# Patient Record
Sex: MALE | Race: WHITE | Employment: UNEMPLOYED | RURAL
[De-identification: names, ages, dates, MRNs, and addresses within clinical notes are randomized per-mention and may not be internally consistent; named-entity substitution may affect disease eponyms.]

---

## 2017-04-17 ENCOUNTER — OFFICE VISIT (OUTPATIENT)
Dept: FAMILY MEDICINE CLINIC | Age: 27
End: 2017-04-17

## 2017-04-17 VITALS
OXYGEN SATURATION: 97 % | RESPIRATION RATE: 18 BRPM | HEIGHT: 73 IN | SYSTOLIC BLOOD PRESSURE: 167 MMHG | DIASTOLIC BLOOD PRESSURE: 101 MMHG | TEMPERATURE: 97.9 F | WEIGHT: 218 LBS | HEART RATE: 93 BPM | BODY MASS INDEX: 28.89 KG/M2

## 2017-04-17 DIAGNOSIS — L02.412 ABSCESS OF AXILLA, LEFT: Primary | ICD-10-CM

## 2017-04-17 RX ORDER — SULFAMETHOXAZOLE AND TRIMETHOPRIM 800; 160 MG/1; MG/1
1 TABLET ORAL 2 TIMES DAILY
Qty: 20 TAB | Refills: 0 | Status: SHIPPED | OUTPATIENT
Start: 2017-04-17 | End: 2017-04-27

## 2017-04-17 RX ORDER — CHLORHEXIDINE GLUCONATE 4 G/100ML
SOLUTION TOPICAL
Qty: 473 ML | Refills: 3 | Status: SHIPPED | OUTPATIENT
Start: 2017-04-17

## 2017-04-17 NOTE — MR AVS SNAPSHOT
Visit Information Date & Time Provider Department Dept. Phone Encounter #  
 4/17/2017  4:00 PM Hans Balbuena  Follow-up Instructions Return in about 3 days (around 4/20/2017) for follow up. Upcoming Health Maintenance Date Due DTaP/Tdap/Td series (1 - Tdap) 6/15/2013 INFLUENZA AGE 9 TO ADULT 8/1/2016 Allergies as of 4/17/2017  Review Complete On: 4/17/2017 By: Tal Mancini LPN Not on File Current Immunizations  Never Reviewed No immunizations on file. Not reviewed this visit You Were Diagnosed With   
  
 Codes Comments Abscess of axilla, left    -  Primary ICD-10-CM: L69.249 ICD-9-CM: 835. 3 Vitals BP Pulse Temp Resp Height(growth percentile) (!) 167/101 (BP 1 Location: Right arm, BP Patient Position: Sitting) 93 97.9 °F (36.6 °C) (Temporal) 18 6' 1\" (1.854 m) Weight(growth percentile) SpO2 BMI Smoking Status 218 lb (98.9 kg) 97% 28.76 kg/m2 Current Every Day Smoker Vitals History BMI and BSA Data Body Mass Index Body Surface Area 28.76 kg/m 2 2.26 m 2 Preferred Pharmacy Pharmacy Name Phone [id number] [address] [facility], [address] [phone] Your Updated Medication List  
  
   
This list is accurate as of: 4/17/17  4:15 PM.  Always use your most recent med list.  
  
  
  
  
 chlorhexidine 4 % liquid Commonly known as:  HIBICLENS Dilute in water and wash area daily. trimethoprim-sulfamethoxazole 160-800 mg per tablet Commonly known as:  BACTRIM DS, SEPTRA DS Take 1 Tab by mouth two (2) times a day for 10 days. Prescriptions Sent to Pharmacy Refills  
 trimethoprim-sulfamethoxazole (BACTRIM DS, SEPTRA DS) 160-800 mg per tablet 0 Sig: Take 1 Tab by mouth two (2) times a day for 10 days.   
 Class: Normal  
 Pharmacy: [facility] [address] #: [phone] Route: Oral  
 chlorhexidine (HIBICLENS) 4 % liquid 3 Sig: Dilute in water and wash area daily. Class: Normal  
 Pharmacy: [address] [place] [address] #: [phone] We Performed the Following AEROBIC BACTERIAL CULTURE [id number] CPT(R)] Follow-up Instructions Return in about 3 days (around 4/20/2017) for follow up. Patient Instructions Skin Abscess: Care Instructions Your Care Instructions A skin abscess is a bacterial infection that forms a pocket of pus. A boil is a kind of skin abscess. The doctor may have cut an opening in the abscess so that the pus can drain out. You may have gauze in the cut so that the abscess will stay open and keep draining. You may need antibiotics. You will need to follow up with your doctor to make sure the infection has gone away. The doctor has checked you carefully, but problems can develop later. If you notice any problems or new symptoms, get medical treatment right away. Follow-up care is a key part of your treatment and safety. Be sure to make and go to all appointments, and call your doctor if you are having problems. It's also a good idea to know your test results and keep a list of the medicines you take. How can you care for yourself at home? · Apply warm and dry compresses, a heating pad set on low, or a hot water bottle 3 or 4 times a day for pain. Keep a cloth between the heat source and your skin. · If your doctor prescribed antibiotics, take them as directed. Do not stop taking them just because you feel better. You need to take the full course of antibiotics. · Take pain medicines exactly as directed. ¨ If the doctor gave you a prescription medicine for pain, take it as prescribed. ¨ If you are not taking a prescription pain medicine, ask your doctor if you can take an over-the-counter medicine. · Keep your bandage clean and dry.  Change the bandage whenever it gets wet or dirty, or at least one time a day. · If the abscess was packed with gauze: 
¨ Keep follow-up appointments to have the gauze changed or removed. If the doctor instructed you to remove the gauze, gently pull out all of the gauze when your doctor tells you to. ¨ After the gauze is removed, soak the area in warm water for 15 to 20 minutes 2 times a day, until the wound closes. When should you call for help? Call your doctor now or seek immediate medical care if: 
· You have signs of worsening infection, such as: 
¨ Increased pain, swelling, warmth, or redness. ¨ Red streaks leading from the infected skin. ¨ Pus draining from the wound. ¨ A fever. Watch closely for changes in your health, and be sure to contact your doctor if: 
· You do not get better as expected. Where can you learn more? Go to http://rossana-leonard.info/. Enter T432 in the search box to learn more about \"Skin Abscess: Care Instructions. \" Current as of: October 13, 2016 Content Version: 11.2 © 4231-5267 [employer]. Care instructions adapted under license by [employer] (which disclaims liability or warranty for this information). If you have questions about a medical condition or this instruction, always ask your healthcare professional. [place] [address] any warranty or liability for your use of this information. Introducing [facility]! [facility] introduces NPTV patient portal. Now you can access parts of your medical record, email your doctor's office, and request medication refills online. 1. In your internet browser, go to [email or web address] 2. Click on the First Time User? Click Here link in the Sign In box. You will see the New Member Sign Up page. 3. Enter your NPTV Access Code exactly as it appears below. You will not need to use this code after youve completed the sign-up process.  If you do not sign up before the expiration date, you must request a new code. · [employer] Access Code: [email or web address] Expires: 7/16/2017  4:15 PM 
 
4. Enter the last four digits of your Social Security Number (xxxx) and Date of Birth (mm/dd/yyyy) as indicated and click Submit. You will be taken to the next sign-up page. 5. Create a [employer] ID. This will be your [employer] login ID and cannot be changed, so think of one that is secure and easy to remember. 6. Create a [employer] password. You can change your password at any time. 7. Enter your Password Reset Question and Answer. This can be used at a later time if you forget your password. 8. Enter your e-mail address. You will receive e-mail notification when new information is available in [address]. 9. Click Sign Up. You can now view and download portions of your medical record. 10. Click the Download Summary menu link to download a portable copy of your medical information. If you have questions, please visit the Frequently Asked Questions section of the [employer] website. Remember, [employer] is NOT to be used for urgent needs. For medical emergencies, dial 911. Now available from your iPhone and Android! Please provide this summary of care documentation to your next provider. If you have any questions after today's visit, please call [phone].

## 2017-04-17 NOTE — PROGRESS NOTES
Identified pt with two pt identifiers(name and ). Chief Complaint   Patient presents with    Mass     Ingrown hair under left arm        Health Maintenance Due   Topic    DTaP/Tdap/Td series (1 - Tdap)    INFLUENZA AGE 9 TO ADULT        Wt Readings from Last 3 Encounters:   17 218 lb (98.9 kg)     Temp Readings from Last 3 Encounters:   17 97.9 °F (36.6 °C) (Temporal)     BP Readings from Last 3 Encounters:   17 (!) 167/101     Pulse Readings from Last 3 Encounters:   17 93         Learning Assessment:  :     Learning Assessment 2017   PRIMARY LEARNER Patient   PRIMARY LANGUAGE ENGLISH   LEARNER PREFERENCE PRIMARY OTHER (COMMENT)   ANSWERED BY self   RELATIONSHIP SELF       Depression Screening:  :     No flowsheet data found. Fall Risk Assessment:  :     No flowsheet data found. Abuse Screening:  :     No flowsheet data found. Coordination of Care Questionnaire:  :     1) Have you been to an emergency room, urgent care clinic since your last visit? no   Hospitalized since your last visit? no             2) Have you seen or consulted any other health care providers outside of [address] [place] [address] since your last visit? no  (Include any pap smears or colon screenings in this section.)    3) Do you have an Advance Directive on file? no  Are you interested in receiving information about Advance Directives? no    Reviewed record in preparation for visit and have obtained necessary documentation. Medication reconciliation up to date and corrected with patient at this time.

## 2017-04-17 NOTE — PATIENT INSTRUCTIONS

## 2017-04-18 NOTE — PROGRESS NOTES
CC:  Chief Complaint   Patient presents with    Mass     Ingrown hair under left arm     HISTORY F PRESENT ILLNESS  Phuong Blackman is a 27 y.o. male. HPI Comments: Who presents today as a new patient with an enlarged, draining abscess under the L-arm. It started out as a small pimple but grew to the size of a golf ball. Last night, he was able express a great deal of yellow thick material from the biggest lesion and the pain is less than previously. Has a small lesion just adjacently that is red and fluctuant has has come to a head. These re-occur from time to time. Mostly, they burst and it resolves. This is the largest lesion that he has. He has never required surgical debridement or evaluated for surgical debridement in the past.         ROS:  Review of Systems   All other systems reviewed and are negative. OBJECTIVE:  Visit Vitals    BP (!) 167/101 (BP 1 Location: Right arm, BP Patient Position: Sitting)  Comment: auto cuff    Pulse 93    Temp 97.9 °F (36.6 °C) (Temporal)    Resp 18    Ht 6' 1\" (1.854 m)    Wt 218 lb (98.9 kg)    SpO2 97%    BMI 28.76 kg/m2   Physical Exam   Constitutional: He appears well-developed. HENT:   Head: Normocephalic and atraumatic. Eyes: Conjunctivae and EOM are normal. Pupils are equal, round, and reactive to light. Neck: Normal range of motion. Cardiovascular: Normal rate and regular rhythm. Pulmonary/Chest: Effort normal and breath sounds normal.       Neurological: He is alert. Skin: Skin is warm. Psychiatric: He has a normal mood and affect. His behavior is normal.   Nursing note and vitals reviewed. ASSESSMENT and PLAN    ICD-10-CM ICD-9-CM    1. Abscess of axilla, left L02.412 682. 3 AEROBIC BACTERIAL CULTURE      trimethoprim-sulfamethoxazole (BACTRIM DS, SEPTRA DS) 160-800 mg per tablet      chlorhexidine (HIBICLENS) 4 % liquid     Concern is that this is probably MRSA and reinfected ingrown hairs.  Possibility of Hidradenitis as well. Expressed large amount of purulent material. Cleansed thoroughly with normal saline and betadine. Rinsed with wound care spray. Will treat with antibiotics and encouraged to call at the first sign of recurrence so that we can get him in to see the surgeons. Will advise when the labs return and will have him return in 3-days for follow up. Instructed to keep leaking lesions covered. Pt verbalizes understanding of plan of care and denies further questions or concerns at this time. Follow-up Disposition:  Return in about 3 days (around 4/20/2017) for follow up.

## 2017-04-19 ENCOUNTER — OFFICE VISIT (OUTPATIENT)
Dept: FAMILY MEDICINE CLINIC | Age: 27
End: 2017-04-19

## 2017-04-19 VITALS
WEIGHT: 217 LBS | RESPIRATION RATE: 16 BRPM | DIASTOLIC BLOOD PRESSURE: 100 MMHG | BODY MASS INDEX: 28.76 KG/M2 | TEMPERATURE: 97.7 F | SYSTOLIC BLOOD PRESSURE: 149 MMHG | HEART RATE: 79 BPM | HEIGHT: 73 IN

## 2017-04-19 DIAGNOSIS — L02.419 AXILLARY ABSCESS: Primary | ICD-10-CM

## 2017-04-19 NOTE — MR AVS SNAPSHOT
Visit Information Date & Time Provider Department Dept. Phone Encounter #  
 4/19/2017  1:00 PM [facility] [phone] [id number] Follow-up Instructions Return if symptoms worsen or fail to improve. Upcoming Health Maintenance Date Due Pneumococcal 19-64 Medium Risk (1 of 1 - PPSV23) 6/15/2011 DTaP/Tdap/Td series (1 - Tdap) 6/15/2013 INFLUENZA AGE 9 TO ADULT 8/1/2016 Allergies as of 4/19/2017  Review Complete On: 4/19/2017 By: Linden Griffin LPN No Known Allergies Current Immunizations  Never Reviewed No immunizations on file. Not reviewed this visit Vitals BP Pulse Temp Resp  
 (!) 149/100 (BP 1 Location: Left arm, BP Patient Position: Sitting) 79 97.7 °F (36.5 °C) (Temporal) 16 Height(growth percentile) Weight(growth percentile) BMI Smoking Status 6' 1\" (1.854 m) 217 lb (98.4 kg) 28.63 kg/m2 Current Every Day Smoker Vitals History BMI and BSA Data Body Mass Index Body Surface Area  
 28.63 kg/m 2 2.25 m 2 Preferred Pharmacy Pharmacy Name Phone [id number] [address] [facility], [address] [phone] Your Updated Medication List  
  
   
This list is accurate as of: 4/19/17  1:36 PM.  Always use your most recent med list.  
  
  
  
  
 chlorhexidine 4 % liquid Commonly known as:  HIBICLENS Dilute in water and wash area daily. trimethoprim-sulfamethoxazole 160-800 mg per tablet Commonly known as:  BACTRIM DS, SEPTRA DS Take 1 Tab by mouth two (2) times a day for 10 days. Follow-up Instructions Return if symptoms worsen or fail to improve. Introducing [facility]! Denise Matos introduces meebee patient portal. Now you can access parts of your medical record, email your doctor's office, and request medication refills online. 1. In your internet browser, go to [email or web address] 2. Click on the First Time User? Click Here link in the Sign In box. You will see the New Member Sign Up page. 3. Enter your Bright[employer] Access Code exactly as it appears below. You will not need to use this code after youve completed the sign-up process. If you do not sign up before the expiration date, you must request a new code. · [employer] Access Code: [email or web address] Expires: 7/16/2017  4:15 PM 
 
4. Enter the last four digits of your Social Security Number (xxxx) and Date of Birth (mm/dd/yyyy) as indicated and click Submit. You will be taken to the next sign-up page. 5. Create a Bright[employer]com ID. This will be your Bright.com login ID and cannot be changed, so think of one that is secure and easy to remember. 6. Create a Bright.com password. You can change your password at any time. 7. Enter your Password Reset Question and Answer. This can be used at a later time if you forget your password. 8. Enter your e-mail address. You will receive e-mail notification when new information is available in [address] 9. Click Sign Up. You can now view and download portions of your medical record. 10. Click the Download Summary menu link to download a portable copy of your medical information. If you have questions, please visit the Frequently Asked Questions section of the Bright.[email or web address] website. Remember, [employer] is NOT to be used for urgent needs. For medical emergencies, dial 911. Now available from your iPhone and Android! Please provide this summary of care documentation to your next provider. If you have any questions after today's visit, please call [phone].

## 2017-04-19 NOTE — PROGRESS NOTES
Identified pt with two pt identifiers(name and ). Chief Complaint   Patient presents with    Cyst     follow up from cyst drainage    Hypertension     reports that he has always had HBP even when he was in high school and they would not let him play football         Health Maintenance Due   Topic    Pneumococcal 19-64 Medium Risk (1 of 1 - PPSV23)    DTaP/Tdap/Td series (1 - Tdap)    INFLUENZA AGE 9 TO ADULT        Wt Readings from Last 3 Encounters:   17 217 lb (98.4 kg)   17 218 lb (98.9 kg)     Temp Readings from Last 3 Encounters:   17 97.9 °F (36.6 °C) (Temporal)     BP Readings from Last 3 Encounters:   17 (!) 167/101     Pulse Readings from Last 3 Encounters:   17 93         Learning Assessment:  :     Learning Assessment 2017   PRIMARY LEARNER Patient   PRIMARY LANGUAGE ENGLISH   LEARNER PREFERENCE PRIMARY OTHER (COMMENT)   ANSWERED BY self   RELATIONSHIP SELF       Depression Screening:  :     PHQ 2 / 9, over the last two weeks 2017   Little interest or pleasure in doing things Not at all   Feeling down, depressed or hopeless Not at all   Total Score PHQ 2 0       Abuse Screening:  :     Abuse Screening Questionnaire 2017   Do you ever feel afraid of your partner? N   Are you in a relationship with someone who physically or mentally threatens you? N   Is it safe for you to go home? Y       Coordination of Care Questionnaire:  :     1) Have you been to an emergency room, urgent care clinic since your last visit? no   Hospitalized since your last visit? no             2) Have you seen or consulted any other health care providers outside of [address] [place] [address] since your last visit? no  (Include any pap smears or colon screenings in this section.)    3) Do you have an Advance Directive on file? no  Are you interested in receiving information about Advance Directives? no    Patient is accompanied by self.      Reviewed record in preparation for visit and have obtained necessary documentation. Medication reconciliation up to date and corrected with patient at this time.

## 2017-04-20 LAB
BACTERIA SPEC AEROBE CULT: ABNORMAL
BACTERIA SPEC CULT: ABNORMAL
OTHER ANTIBIOTIC SUSC ISLT: ABNORMAL

## 2017-04-20 NOTE — PROGRESS NOTES
As expected, he has MRSA growing in his culture. It is sensitive to the Bactrim. Make sure to complete all of the antibiotics. Return if recurrent lesion or pustule.

## 2017-04-28 NOTE — PROGRESS NOTES
HISTORY OF PRESENT ILLNESS  Devon Hdz is a 27 y.o. male. HPI Comments: Who presents today for routine follow up and evaluation of under arm cyst.   It has really healed. No drainage and minimally red and inflamed. He is feeling better. Cyst     Hypertension            ROS:  Review of Systems   All other systems reviewed and are negative. OBJECTIVE:  Visit Vitals    BP (!) 149/100 (BP 1 Location: Left arm, BP Patient Position: Sitting)    Pulse 79    Temp 97.7 °F (36.5 °C) (Temporal)    Resp 16    Ht 6' 1\" (1.854 m)    Wt 217 lb (98.4 kg)    BMI 28.63 kg/m2   Physical Exam   HENT:   Head: Normocephalic. Eyes: Pupils are equal, round, and reactive to light. Neck: Normal range of motion. Cardiovascular: Normal rate and regular rhythm. Pulmonary/Chest: Effort normal and breath sounds normal.   Musculoskeletal: Normal range of motion. Neurological: He is alert. Nursing note and vitals reviewed. ASSESSMENT and PLAN    ICD-10-CM ICD-9-CM    1. Axillary abscess L02.419 682.3 Resolving. Left arm abscess almost completely healed. Small erythematous lesion left. Before closing this note, his culture came back showing MRSA. He is to complete treatment with antibiotics. Continue with Hibiclens. Pt verbalizes understanding of plan of care and denies further questions or concerns at this time. Follow-up Disposition:  Return if symptoms worsen or fail to improve.

## 2017-06-16 ENCOUNTER — HOSPITAL ENCOUNTER (EMERGENCY)
Age: 27
Discharge: HOME | End: 2017-06-17
Attending: EMERGENCY MEDICINE
Payer: SELF-PAY

## 2017-06-16 ENCOUNTER — APPOINTMENT (OUTPATIENT)
Dept: GENERAL RADIOLOGY | Age: 27
End: 2017-06-16
Attending: NURSE PRACTITIONER
Payer: SELF-PAY

## 2017-06-16 DIAGNOSIS — R03.0 ELEVATED BLOOD PRESSURE READING: ICD-10-CM

## 2017-06-16 DIAGNOSIS — Z72.0 TOBACCO ABUSE: ICD-10-CM

## 2017-06-16 DIAGNOSIS — S61.412A LACERATION OF LEFT HAND WITHOUT FOREIGN BODY, INITIAL ENCOUNTER: ICD-10-CM

## 2017-06-16 DIAGNOSIS — S69.92XA FINGER INJURY, LEFT, INITIAL ENCOUNTER: Primary | ICD-10-CM

## 2017-06-16 PROCEDURE — 74011250637 HC RX REV CODE- 250/637: Performed by: NURSE PRACTITIONER

## 2017-06-16 PROCEDURE — 75810000293 HC SIMP/SUPERF WND  RPR

## 2017-06-16 PROCEDURE — 77030018836 HC SOL IRR NACL ICUM -A

## 2017-06-16 PROCEDURE — 73130 X-RAY EXAM OF HAND: CPT

## 2017-06-16 PROCEDURE — 90471 IMMUNIZATION ADMIN: CPT

## 2017-06-16 PROCEDURE — 99283 EMERGENCY DEPT VISIT LOW MDM: CPT

## 2017-06-16 PROCEDURE — 74011000250 HC RX REV CODE- 250: Performed by: NURSE PRACTITIONER

## 2017-06-16 PROCEDURE — 90715 TDAP VACCINE 7 YRS/> IM: CPT | Performed by: NURSE PRACTITIONER

## 2017-06-16 PROCEDURE — 74011250636 HC RX REV CODE- 250/636: Performed by: NURSE PRACTITIONER

## 2017-06-16 RX ORDER — CEPHALEXIN 250 MG/1
500 CAPSULE ORAL
Status: COMPLETED | OUTPATIENT
Start: 2017-06-16 | End: 2017-06-16

## 2017-06-16 RX ADMIN — TETANUS TOXOID, REDUCED DIPHTHERIA TOXOID AND ACELLULAR PERTUSSIS VACCINE, ADSORBED 0.5 ML: 5; 2.5; 8; 8; 2.5 SUSPENSION INTRAMUSCULAR at 22:45

## 2017-06-16 RX ADMIN — CEPHALEXIN 500 MG: 250 CAPSULE ORAL at 22:45

## 2017-06-16 RX ADMIN — Medication 5 ML: at 22:45

## 2017-06-17 VITALS
WEIGHT: 212 LBS | TEMPERATURE: 97.5 F | HEIGHT: 73 IN | SYSTOLIC BLOOD PRESSURE: 199 MMHG | DIASTOLIC BLOOD PRESSURE: 130 MMHG | BODY MASS INDEX: 28.1 KG/M2 | OXYGEN SATURATION: 97 % | RESPIRATION RATE: 20 BRPM | HEART RATE: 81 BPM

## 2017-06-17 PROCEDURE — 77030008326 HC SPLNT FNGR PLSTL DERY -A

## 2017-06-17 PROCEDURE — 77030002916 HC SUT ETHLN J&J -A

## 2017-06-17 RX ORDER — CEPHALEXIN 500 MG/1
500 CAPSULE ORAL 4 TIMES DAILY
Qty: 28 CAP | Refills: 0 | Status: SHIPPED | OUTPATIENT
Start: 2017-06-17 | End: 2017-06-17

## 2017-06-17 RX ORDER — SULFAMETHOXAZOLE AND TRIMETHOPRIM 800; 160 MG/1; MG/1
1 TABLET ORAL 2 TIMES DAILY
Qty: 20 TAB | Refills: 0 | Status: SHIPPED | OUTPATIENT
Start: 2017-06-17 | End: 2017-06-27

## 2017-06-17 NOTE — ED PROVIDER NOTES
HPI Comments: Kristie Valencia is a 24 y.o. male with Hx of tobacco abuse who presents ambulatory with friends to [facility] [place] ED with cc of laceration to knuckle 2nd digit on L hand. Pt reports he was placing a security system at a [employer] tonight when sheet metal cut the knuckle on the L hand. He reports [facility] saw him this evening and referred him to ED as they felt he had cut something \"important\" in his L hand. Pt reports ongoing non-pulsatile bleeding from wound site and difficulty with extending his 2nd digit on L hand. No x-ray/ medications administered at [facility]. TDAP not UTD. Denies any medical history. PCP: None    There are no other complaints, changes or physical findings at this time. The history is provided by the patient. History reviewed. No pertinent past medical history. Past Surgical History:   Procedure Laterality Date    CARDIAC SURG PROCEDURE UNLIST      reverse valve as a child         Family History:   Problem Relation Age of Onset    No Known Problems Mother     No Known Problems Father        Social History     Social History    Marital status:      Spouse name: N/A    Number of children: N/A    Years of education: N/A     Occupational History    Not on file. Social History Main Topics    Smoking status: Current Every Day Smoker     Packs/day: 2.00     Types: Cigarettes    Smokeless tobacco: Never Used    Alcohol use 1.2 oz/week     2 Cans of beer per week      Comment: weekly    Drug use: No    Sexual activity: Yes     Partners: Female     Birth control/ protection: None     Other Topics Concern    Not on file     Social History Narrative         ALLERGIES: Review of patient's allergies indicates no known allergies. Review of Systems   Constitutional: Negative for activity change, appetite change, chills and fever. HENT: Negative for congestion, rhinorrhea, sinus pressure, sneezing and sore throat.     Eyes: Negative for pain, discharge and visual disturbance. Respiratory: Negative for cough and shortness of breath. Cardiovascular: Negative for chest pain. Gastrointestinal: Negative for abdominal pain, diarrhea, nausea and vomiting. Genitourinary: Negative for dysuria, flank pain, frequency and urgency. Musculoskeletal: Positive for arthralgias. Negative for back pain, gait problem, joint swelling, myalgias and neck pain. Skin: Positive for wound. Negative for color change and rash. Neurological: Negative for dizziness, speech difficulty, light-headedness and headaches. Psychiatric/Behavioral: Negative for agitation, behavioral problems and confusion. All other systems reviewed and are negative. Vitals:    06/16/17 2146 06/16/17 2315 06/17/17 0021 06/17/17 0022   BP: (!) 182/112 (!) 180/98 (!) 209/131 (!) 199/130   Pulse: 91  81    Resp: 18  20 20   Temp: 97.8 °F (36.6 °C)  97.5 °F (36.4 °C)    SpO2: 97% 97%     Weight: 96.2 kg (212 lb)      Height: 6' 1\" (1.854 m)               Physical Exam   Constitutional: He is oriented to person, place, and time. He appears well-developed and well-nourished. No distress. HENT:   Head: Normocephalic and atraumatic. Right Ear: External ear normal.   Left Ear: External ear normal.   Nose: Nose normal.   Mouth/Throat: Oropharynx is clear and moist. No oropharyngeal exudate. Eyes: Conjunctivae and EOM are normal. Pupils are equal, round, and reactive to light. Neck: Normal range of motion. Neck supple. Cardiovascular: Normal rate, regular rhythm, normal heart sounds and intact distal pulses. Pulmonary/Chest: Effort normal and breath sounds normal.   Abdominal: Bowel sounds are normal.   Musculoskeletal: Normal range of motion.   (+) 3 cm avulsed/ linear lacerated noted over knuckle to 2nd digit L hand; bleeding is controlled and non-pulsatile    Neurological: He is alert and oriented to person, place, and time. No cranial nerve deficit.  Coordination normal.   Skin: Skin is warm and dry. Psychiatric: He has a normal mood and affect. His behavior is normal. Judgment and thought content normal.   Nursing note and vitals reviewed. MDM  Number of Diagnoses or Management Options  Elevated blood pressure reading:   Finger injury, left, initial encounter:   Laceration of left hand without foreign body, initial encounter:   Tobacco abuse:   Diagnosis management comments: DDx: Fx/ tendon injury/ high BP/ tobacco abuse     25 yo M presents with laceration over 2nd digit L hand post sheet metal incident. (-) fx on x-ray. Difficulty with extension of finger, possible tendon injury. Splint placed and started on ABx given CARLITA. . The patient has been educated on the use of NSAIDS/ avoidance of strenuous activities to assist with relief of current symptoms and use of splint at home. Patient has been instructed to f/u with Orthopedic Surgery for further tx of symptoms. Pt non-toxic without emergent s/sx related to elevated BP reading. Patient was seen and evaluated in the ED today with an elevated blood pressure reading. They were advised on the dangers of high blood pressure and need for primary care follow up. Patient was told they can follow up with PCP for chronic medical needs. If the patient does not have a PCP, they have been provided with a list of local providers and encouraged to establish care in the community. tobacco cessation education provided for < 15 minutes. Reasons to return to the ED have been reviewed at time of discharge. Amount and/or Complexity of Data Reviewed  Tests in the radiology section of CPT®: ordered and reviewed  Review and summarize past medical records: yes  Discuss the patient with other providers: yes      ED Course       Procedures    Procedure Note - Laceration Repair:  12:18 AM  Procedure by Royal Valenzuela NP.   Complexity: simple  3 cm linear laceration to knuckle 2nd digit L hand  was irrigated copiously with NS under jet lavage, prepped with Hibiclens and draped in a sterile fashion. The area was anesthetized with 5 mLs of  LET via topical application. The wound was explored with the following results: No foreign bodies found. The wound was repaired with One layer suture closure: Skin Layer:  3 sutures placed, stitch type:simple interrupted, suture: 4-0 nylon. .  The wound was closed with good hemostasis and approximation. Sterile dressing applied. Estimated blood loss: < 5 mL  The procedure took 1-15 minutes, and pt tolerated well. LABORATORY TESTS:  No results found for this or any previous visit (from the past 12 hour(s)). IMAGING RESULTS:  XR HAND LT MIN 3 V   Final Result          MEDICATIONS GIVEN:  Medications   diph,Pertuss(AC),Tet Vac-PF (BOOSTRIX) suspension 0.5 mL (0.5 mL IntraMUSCular Given 6/16/17 2245)   cephALEXin (KEFLEX) capsule 500 mg (500 mg Oral Given 6/16/17 2245)   lidocaine/EPINEPHrine/tetracaine/methylcellulose (LET) topical gel gel 5 mL (5 mL Topical Given 6/16/17 2245)       IMPRESSION:  1. Finger injury, left, initial encounter    2. Laceration of left hand without foreign body, initial encounter    3. Elevated blood pressure reading    4. Tobacco abuse        PLAN:  1. Discharge Medication List as of 6/17/2017 12:26 AM      START taking these medications    Details   trimethoprim-sulfamethoxazole (BACTRIM DS) 160-800 mg per tablet Take 1 Tab by mouth two (2) times a day for 10 days. , Print, Disp-20 Tab, R-0         CONTINUE these medications which have NOT CHANGED    Details   chlorhexidine (HIBICLENS) 4 % liquid Dilute in water and wash area daily. , Normal, Disp-473 mL, R-3           2.    Follow-up Information     Follow up With Details Comments Tereso Hunt MD Schedule an appointment as soon as possible for a visit  [address] [facility] [address] [phone]      [facility] EMERGENCY DEPT Go to As needed, If symptoms worsen [address] [id number]  [phone]    Please set up a primary care ASAP to have your blood pressure rechecked            3. Return to ED if worse     Discharge Note:    The patient is ready for discharge. The patient's signs, symptoms, diagnosis, and discharge instruction have been discussed and the patient has conveyed their understanding. The patient is to follow up as recommended or return to the ER should their symptoms worsen. Plan has been discussed and the patient is in agreement.     Verónica Becerra NP

## 2017-06-17 NOTE — ED TRIAGE NOTES
Pt presents with laceration to top of left index finger knuckle after cutting it on a metal roof. [facility] for [facility] evaluation. Tetanus is not up to date.

## 2017-06-17 NOTE — DISCHARGE INSTRUCTIONS
We hope that we have addressed all of your medical concerns. The examination and treatment you received in the Emergency Department were for an emergent problem and were not intended as complete care. It is important that you follow up with your healthcare provider(s) for ongoing care. If your symptoms worsen or do not improve as expected, and you are unable to reach your usual health care provider(s), you should return to the Emergency Department. Today's healthcare is undergoing tremendous change, and patient satisfaction surveys are one of the many tools to assess the quality of medical care. You may receive a survey from the [facility] regarding your experience in the Emergency Department. I hope that your experience has been completely positive, particularly the medical care that I provided. As such, please participate in the survey; anything less than excellent does not meet my expectations or intentions. [facility] and [address] [place] [address] participate in nationally recognized quality of care measures. If your blood pressure is greater than 120/80, as reported below, we urge that you seek medical care to address the potential of high blood pressure, commonly known as hypertension. Hypertension can be hereditary or can be caused by certain medical conditions, pain, stress, or \"white coat syndrome. \"       Please make an appointment with your health care provider(s) for follow up of your Emergency Department visit. VITALS:   Patient Vitals for the past 8 hrs:   Temp Pulse Resp BP SpO2   06/16/17 2315 - - - (!) 180/98 97 %   06/16/17 2146 97.8 °F (36.6 °C) 91 18 (!) 182/112 97 %          Thank you for allowing us to provide you with medical care today. We realize that you have many choices for your emergency care needs. Please choose us in the future for any continued health care needs. Ana Tavares, BRISEIDA US Jonathan Aquino 70: [phone]            No results found for this or any previous visit (from the past 24 hour(s)). Xr Hand Lt Min 3 V    Result Date: 6/16/2017  EXAM:  XR HAND LT MIN 3 V INDICATION:  2nd digit injury with sheet metal. COMPARISON: None. FINDINGS: Three views of the left hand demonstrate no fracture, dislocation or other acute osseous or articular abnormality. The soft tissues are within normal limits. No evidence of a radiopaque foreign body     IMPRESSION:  No evidence acute abnormality. Cuts Closed With Stitches: Care Instructions  Your Care Instructions  A cut can happen anywhere on your body. The doctor used stitches to close the cut. Using stitches also helps the cut heal and reduces scarring. Sometimes pieces of tape called Steri-Strips are put over the stitches. If the cut went deep and through the skin, the doctor may have put in two layers of stitches. The deeper layer brings the deep part of the cut together. These stitches will dissolve and don't need to be removed. The stitches in the upper layer are the ones you see on the cut. You will probably have a bandage over the stitches. You will need to have the stitches removed, usually in 10 to 14 days. The doctor has checked you carefully, but problems can develop later. If you notice any problems or new symptoms, get medical treatment right away. Follow-up care is a key part of your treatment and safety. Be sure to make and go to all appointments, and call your doctor if you are having problems. It's also a good idea to know your test results and keep a list of the medicines you take. How can you care for yourself at home? · Keep the cut dry for the first 24 to 48 hours. After this, you can shower if your doctor okays it. Pat the cut dry. · Don't soak the cut, such as in a bathtub. Your doctor will tell you when it's safe to get the cut wet.   · If your doctor told you how to care for your cut, follow your doctor's instructions. If you did not get instructions, follow this general advice:  ¨ After the first 24 to 48 hours, wash around the cut with clean water 2 times a day. Don't use hydrogen peroxide or alcohol, which can slow healing. ¨ You may cover the cut with a thin layer of petroleum jelly, such as Vaseline, and a nonstick bandage. ¨ Apply more petroleum jelly and replace the bandage as needed. · Prop up the sore area on a pillow anytime you sit or lie down during the next 3 days. Try to keep it above the level of your heart. This will help reduce swelling. · Avoid any activity that could cause your cut to reopen. · Do not remove the stitches on your own. Your doctor will tell you when to come back to have the stitches removed. · Leave Steri-Strips on until they fall off. · Be safe with medicines. Read and follow all instructions on the label. ¨ If the doctor gave you a prescription medicine for pain, take it as prescribed. ¨ If you are not taking a prescription pain medicine, ask your doctor if you can take an over-the-counter medicine. When should you call for help? Call your doctor now or seek immediate medical care if:  · You have new pain, or your pain gets worse. · The skin near the cut is cold or pale or changes color. · You have tingling, weakness, or numbness near the cut. · The cut starts to bleed, and blood soaks through the bandage. Oozing small amounts of blood is normal.  · You have trouble moving the area near the cut. · You have symptoms of infection, such as:  ¨ Increased pain, swelling, warmth, or redness around the cut. ¨ Red streaks leading from the cut. ¨ Pus draining from the cut. ¨ A fever. Watch closely for changes in your health, and be sure to contact your doctor if:  · The cut reopens. · You do not get better as expected. Where can you learn more? Go to http://yady.info/.   Enter R217 in the search box to learn more about \"Cuts Closed With Stitches: Care Instructions. \"  Current as of: May 27, 2016  Content Version: 11.2  © 2755-5656 [employer]. Care instructions adapted under license by [employer] (which disclaims liability or warranty for this information). If you have questions about a medical condition or this instruction, always ask your healthcare professional. Tenzin Bird any warranty or liability for your use of this information. [facility] Departments     For adult and child immunizations, family planning, TB screening, STD testing and women's health services. [facility]: [place] [phone]      [facility] D 25   [address]   [address]   170 [facility]: [facility] 200 [facility] [phone]      [facility]          [address] [address]     For primary care services, woman and child wellness, and some clinics providing specialty care. [facility] -- [facility]. [address] [place] [address] [phone]   [address] [place] [address] 200 [facility] 3614 [facility] [phone]   339 [facility] [address] [phone]   [address] [facility] Community  [phone]   [address] [place], [place] [address] [place] [phone]   [facility] 81 [facility] [phone]   [facility] [address] [place] [address] [phone]   [facility]: [facility] [address] [facility] [phone]     102 [facility] 503 [facility] [address] [phone]   [facility] 5850  Community  [phone]   [employer] [facility]  [address], [place] [address] ([email or web address]) [phone]         Sexual Health/Woman Wellness Clinics    For STD/HIV testing and treatment, pregnancy testing and services, men's health, birth control services, LGBT services, and hepatitis/HPV vaccine services. [facility] for [place] [employer] 201 [address]. [facility] 75 [facility] [address] [facility] 1579 [address] [phone]   [facility] [address], 5th floor [phone]   Pregnancy [address] for Women [address]  [place] [phone]         [place] [address] [facility] [address] [place] [address]   [phone]   [place]   [phone]   Women, Infant and Children's Services: [address] [phone] 600 [facility]   [phone]   [address]   0870 [facility] Psychiat[facility]     [phone]   [address] Crisis   1212 [facility] [phone]     Local Primary Care Physicians  [facility] [phone]  MD Jayleen Brice MD Brooksie Lav, MD [facility] Doctors [phone]  Sung Gomez, [facility]  MD Brent Trevino MD Katherene Breaker, MD   [facility] [address] [place] [address] [phone]  MD Steve Guo [place] [address] [facility] [phone]  Dixon Pun, MD Pete Forward, MD Viola Kocher, MD Nani Balint, MD   [facility] [phone]  [facility] MD Hua WAGNER MD Deleta Sauce, NP [address] [place] [address] [phone]  MD Sae Vora MD Susen Krauss, MD Leary Overcast, MD Jolinda Shields, MD Harrel Demark, MD Lunette Paci, MD   [facility] POINT [phone]  [place] MD [address] [facility] [address] [phone]  Oval Jesús, MD Dariel Hollis, MD Denis Jackson MD Daniel Cake, MD Floreen Sinks, MD  Alcides Skyler Sara Geiger MD   [address] [facility] [phone]  Zena Larsen, MD Curt Nation, FNP  Caitie Gallagher, NP  Ochoa Erazo, MD Enrrique Felipe, MD Bisi Nelson, MD Trinity Smiley, MD JAIME [place] [facility] [phone]  Maegan Palma, MD Nancy Huffman, MD Eda Maldonado, MD Anne-Marie Quevedo, MD Shonna Bryant, MD   [facility] [phone]  MD Sebastián Richter MD [place] [phone]  Leroy Chavarria, MD Marlee Yarbrough, MD Juan Manuel Dawn, MD   [facility] [phone]  Ford Felix, MD Gomez Cruz, MD Savanna Ge, MD Regan Thomas, MD Angelic Mariscal, MD Opal Beltran, NP  Durga Maya MD 1619 [facility]   [phone]  MD Robby Avendano, MD Mary Bowden MD   6064 [facility] [phone]  [place] [address], MD Elaine Fowler, FNGRETA Zuluaga, TAMMIE Zuluaga, FNP Leopoldo Junes, PA-C Margretta Reding, MD Esther Culp, NP   Magda Beltran, DO Miscellaneous:  Martha Fuller [phone]